# Patient Record
Sex: MALE
[De-identification: names, ages, dates, MRNs, and addresses within clinical notes are randomized per-mention and may not be internally consistent; named-entity substitution may affect disease eponyms.]

---

## 2018-10-10 ENCOUNTER — TRANSCRIPTION ENCOUNTER (OUTPATIENT)
Age: 28
End: 2018-10-10

## 2018-10-26 ENCOUNTER — TRANSCRIPTION ENCOUNTER (OUTPATIENT)
Age: 28
End: 2018-10-26

## 2020-02-05 ENCOUNTER — TRANSCRIPTION ENCOUNTER (OUTPATIENT)
Age: 30
End: 2020-02-05

## 2021-04-20 ENCOUNTER — TRANSCRIPTION ENCOUNTER (OUTPATIENT)
Age: 31
End: 2021-04-20

## 2021-11-08 ENCOUNTER — TRANSCRIPTION ENCOUNTER (OUTPATIENT)
Age: 31
End: 2021-11-08

## 2022-10-28 ENCOUNTER — EMERGENCY (EMERGENCY)
Facility: HOSPITAL | Age: 32
LOS: 1 days | Discharge: HOME | End: 2022-10-28
Attending: EMERGENCY MEDICINE
Payer: COMMERCIAL

## 2022-10-28 VITALS
WEIGHT: 274.92 LBS | SYSTOLIC BLOOD PRESSURE: 134 MMHG | OXYGEN SATURATION: 98 % | RESPIRATION RATE: 18 BRPM | HEIGHT: 75 IN | TEMPERATURE: 99 F | DIASTOLIC BLOOD PRESSURE: 86 MMHG | HEART RATE: 94 BPM

## 2022-10-28 VITALS
OXYGEN SATURATION: 98 % | TEMPERATURE: 99 F | HEART RATE: 86 BPM | SYSTOLIC BLOOD PRESSURE: 131 MMHG | DIASTOLIC BLOOD PRESSURE: 83 MMHG | RESPIRATION RATE: 17 BRPM

## 2022-10-28 PROCEDURE — 99283 EMERGENCY DEPT VISIT LOW MDM: CPT

## 2022-10-28 PROCEDURE — 99282 EMERGENCY DEPT VISIT SF MDM: CPT

## 2022-10-28 NOTE — ED PROVIDER NOTE - PATIENT PORTAL LINK FT
You can access the FollowMyHealth Patient Portal offered by [facility] by registering at the following website: [email or web address]. By joining [facility]’s FollowMyHealth portal, you will also be able to view your health information using other applications (apps) compatible with our system.

## 2022-10-28 NOTE — ED PROVIDER NOTE - CARE PROVIDER_API CALL
Jarrell Spivey)  Neurology  [address], [address]  [place] [address]  Phone: [phone]  Fax: [phone]  Follow Up Time: 4-6 Days

## 2022-10-28 NOTE — ED PROVIDER NOTE - PHYSICAL EXAMINATION
Attending note.  Patient is alert and in no acute distress.  There is no facial swelling.  There is a pterygium in the left lateral aspect of the eye.  Pupils are 3 mm equal reactive.  There is no nystagmus.  Extraocular muscles are intact.  Cranial nerves are intact.  Neck is supple.  Motor strength is 5/5, equal and symmetrical.  There is no Romberg.  Speech is clear and fluent.

## 2022-10-28 NOTE — ED PROVIDER NOTE - OBJECTIVE STATEMENT
33 y/o M no known PMH presents w/ HA x2 weeks. 31 y/o M no known PMH presents w/ HA x2 weeks.      Attending notes.  Patient was seen in [facility].  He is complaining of intermittent left-sided headaches which last 20 to 25 minutes several times during the course of the day.  He also is associated with some tearing in his left eye.  He also reports a head injury to the left forehead more than 1 year ago.  Headaches are not associated with focal neurologic deficits, nausea or vomiting.  The patient's father reports that when he has a headache he is pacing.  Patient also reports insomnia.  Headaches can occur anytime during the course of the day.  He attempted to self treat with caffeine, however had no improvement.  He has a past medical history of alcohol abuse and alcoholism per the patient.  He does not currently have a headache.

## 2022-10-28 NOTE — ED PROVIDER NOTE - CLINICAL SUMMARY MEDICAL DECISION MAKING FREE TEXT BOX
Attending note.  Patient with report of daily intermittent left-sided headaches which last approximately 20 to 25 minutes and associated with the left eye tearing.  Most likely cluster headaches.  Patient currently does not have a headache.  Referral to neurology.  Advised taking ibuprofen 600 mg 3-4 times a day.

## 2022-10-28 NOTE — ED ADULT NURSE NOTE - OBJECTIVE STATEMENT
32y m pt with father at bedside c/o intermittent headaches for 2 weeks; pt states ha last exactly 25-30 mins; last 2 weeks has been 2 x day; pt states started about 1 year ago; pt also stopped drinking alcohol about that time; pt also states smokes 3 packs of cigarettes per day; pt states is an alcoholic; aox3; no resp distress; no sob; no chest pain; no abd pain; no n/v/d; denies fever/chills; + cough; no congestion; denies numbness/tingling; normal neuro assessment; father at bedside; pt states takes ibuprofen with some relief

## 2023-02-02 ENCOUNTER — NON-APPOINTMENT (OUTPATIENT)
Age: 33
End: 2023-02-02

## 2023-09-25 PROBLEM — Z00.00 ENCOUNTER FOR PREVENTIVE HEALTH EXAMINATION: Status: ACTIVE | Noted: 2023-09-25

## 2025-03-06 ENCOUNTER — NON-APPOINTMENT (OUTPATIENT)
Age: 35
End: 2025-03-06

## 2025-08-23 ENCOUNTER — NON-APPOINTMENT (OUTPATIENT)
Age: 35
End: 2025-08-23

## 2025-08-27 ENCOUNTER — NON-APPOINTMENT (OUTPATIENT)
Age: 35
End: 2025-08-27